# Patient Record
Sex: MALE | Race: WHITE | NOT HISPANIC OR LATINO | Employment: OTHER | URBAN - METROPOLITAN AREA
[De-identification: names, ages, dates, MRNs, and addresses within clinical notes are randomized per-mention and may not be internally consistent; named-entity substitution may affect disease eponyms.]

---

## 2022-04-07 ENCOUNTER — HISTORICAL (OUTPATIENT)
Dept: ADMINISTRATIVE | Facility: HOSPITAL | Age: 77
End: 2022-04-07
Payer: MEDICARE

## 2022-04-24 VITALS
HEIGHT: 69 IN | WEIGHT: 229.06 LBS | SYSTOLIC BLOOD PRESSURE: 130 MMHG | BODY MASS INDEX: 33.93 KG/M2 | DIASTOLIC BLOOD PRESSURE: 78 MMHG

## 2022-09-27 ENCOUNTER — OFFICE VISIT (OUTPATIENT)
Dept: NEUROLOGY | Facility: CLINIC | Age: 77
End: 2022-09-27
Payer: MEDICARE

## 2022-09-27 VITALS
BODY MASS INDEX: 34.71 KG/M2 | HEIGHT: 68 IN | DIASTOLIC BLOOD PRESSURE: 74 MMHG | WEIGHT: 229 LBS | SYSTOLIC BLOOD PRESSURE: 132 MMHG

## 2022-09-27 DIAGNOSIS — G20.A1 PARKINSON'S DISEASE: Primary | ICD-10-CM

## 2022-09-27 PROCEDURE — 99213 OFFICE O/P EST LOW 20 MIN: CPT | Mod: PBBFAC | Performed by: PSYCHIATRY & NEUROLOGY

## 2022-09-27 PROCEDURE — 99999 PR PBB SHADOW E&M-EST. PATIENT-LVL III: ICD-10-PCS | Mod: PBBFAC,,, | Performed by: PSYCHIATRY & NEUROLOGY

## 2022-09-27 PROCEDURE — 99214 PR OFFICE/OUTPT VISIT, EST, LEVL IV, 30-39 MIN: ICD-10-PCS | Mod: S$PBB,,, | Performed by: PSYCHIATRY & NEUROLOGY

## 2022-09-27 PROCEDURE — 99214 OFFICE O/P EST MOD 30 MIN: CPT | Mod: S$PBB,,, | Performed by: PSYCHIATRY & NEUROLOGY

## 2022-09-27 PROCEDURE — 99999 PR PBB SHADOW E&M-EST. PATIENT-LVL III: CPT | Mod: PBBFAC,,, | Performed by: PSYCHIATRY & NEUROLOGY

## 2022-09-27 RX ORDER — ISOSORBIDE MONONITRATE 60 MG/1
60 TABLET, EXTENDED RELEASE ORAL DAILY
Status: ON HOLD | COMMUNITY
Start: 2022-08-29 | End: 2024-03-06 | Stop reason: HOSPADM

## 2022-09-27 RX ORDER — CARBIDOPA AND LEVODOPA 25; 100 MG/1; MG/1
1 TABLET ORAL 3 TIMES DAILY
COMMUNITY
Start: 2022-08-29 | End: 2022-09-27 | Stop reason: SDUPTHER

## 2022-09-27 RX ORDER — CLOPIDOGREL BISULFATE 75 MG/1
75 TABLET ORAL DAILY
COMMUNITY
Start: 2022-08-29

## 2022-09-27 RX ORDER — CARBIDOPA AND LEVODOPA 25; 100 MG/1; MG/1
2 TABLET ORAL 3 TIMES DAILY
Qty: 540 TABLET | Refills: 4 | Status: ON HOLD | OUTPATIENT
Start: 2022-09-27 | End: 2024-03-06 | Stop reason: HOSPADM

## 2022-09-27 RX ORDER — ISOSORBIDE DINITRATE 20 MG/1
TABLET ORAL
COMMUNITY
End: 2023-09-25

## 2022-09-27 RX ORDER — METOPROLOL SUCCINATE 25 MG/1
25 TABLET, EXTENDED RELEASE ORAL 2 TIMES DAILY
COMMUNITY
Start: 2022-08-29

## 2022-09-27 RX ORDER — NITROGLYCERIN 0.4 MG/1
0.4 TABLET SUBLINGUAL EVERY 5 MIN PRN
Status: ON HOLD | COMMUNITY
Start: 2022-09-01 | End: 2024-03-06 | Stop reason: HOSPADM

## 2022-09-27 RX ORDER — PRAVASTATIN SODIUM 80 MG/1
80 TABLET ORAL NIGHTLY
COMMUNITY
Start: 2022-08-29

## 2022-09-27 RX ORDER — DULOXETIN HYDROCHLORIDE 60 MG/1
60 CAPSULE, DELAYED RELEASE ORAL DAILY
COMMUNITY

## 2022-09-27 RX ORDER — LISINOPRIL 20 MG/1
20 TABLET ORAL NIGHTLY
COMMUNITY
Start: 2022-08-29

## 2022-09-27 RX ORDER — RANOLAZINE 1000 MG/1
1000 TABLET, EXTENDED RELEASE ORAL 2 TIMES DAILY
COMMUNITY
Start: 2022-08-29

## 2022-09-27 RX ORDER — HYDROCHLOROTHIAZIDE 12.5 MG/1
12.5 CAPSULE ORAL DAILY
COMMUNITY
Start: 2022-08-29

## 2022-09-27 NOTE — PROGRESS NOTES
"NEUROLOGY  OUTPATIENT OFFICE VISIT NOTE    SUBJECTIVE:      HPI: Jose Alejandro Minor is a 77 y.o. male seen in neurology clinic for follow up of parkinson's disease. Last seen in 9/2021.     Compliant with Senemet 25/100 TID; started 2 years prior   No recent medication changes  Daughter states tremors are worsening in the b/l hands, worse in the morning   Also endorsing stiffer legs and some indigestion   Mild dysphagia when leaning back   Denies hallucinations or recent falls     ROS:  Negative except as stated above     OBJECTIVE:     Vital signs:   /74 (BP Location: Left arm, Patient Position: Sitting)   Ht 5' 8" (1.727 m)   Wt 103.9 kg (229 lb)   BMI 34.82 kg/m²      Physical Examination:  General:  Well-nourished, well-developed, no acute distress  HEENT: Normocephalic, atraumatic. EOMI.  MMM  Neck: Supple. No obvious JVD.  Normal range of motion.  Respiratory: CTAB.  No obvious crackles wheeze or rhonchi.  Cardiovascular:  RRR.  No obvious M/G/R. Warm extremities.  Peripheral pulses intact.  No edema.  Gastrointestinal:  Soft, nontender, nondistended.  Normal bowel sounds.  Neurologic: ANOx3.  Answering questions/following commands appropriately.  No FND. +chin and b/l hand tremors.      Current Outpatient Medications:     clopidogreL (PLAVIX) 75 mg tablet, Take 75 mg by mouth once daily., Disp: , Rfl:     DULoxetine (CYMBALTA) 60 MG capsule, , Disp: , Rfl:     hydroCHLOROthiazide (MICROZIDE) 12.5 mg capsule, Take 12.5 mg by mouth once daily., Disp: , Rfl:     isosorbide dinitrate (ISORDIL) 20 MG tablet, , Disp: , Rfl:     isosorbide mononitrate (IMDUR) 60 MG 24 hr tablet, Take 60 mg by mouth once daily., Disp: , Rfl:     lisinopriL (PRINIVIL,ZESTRIL) 20 MG tablet, Take 20 mg by mouth once daily., Disp: , Rfl:     metoprolol succinate (TOPROL-XL) 25 MG 24 hr tablet, Take 25 mg by mouth 2 (two) times daily., Disp: , Rfl:     nitroGLYCERIN (NITROSTAT) 0.4 MG SL tablet, Place under the tongue., Disp: , " Rfl:     pravastatin (PRAVACHOL) 80 MG tablet, Take 80 mg by mouth once daily., Disp: , Rfl:     ranolazine (RANEXA) 1,000 mg Tb12, Take 1,000 mg by mouth 2 (two) times daily., Disp: , Rfl:     carbidopa-levodopa  mg (SINEMET)  mg per tablet, Take 2 tablets by mouth 3 (three) times daily., Disp: 540 tablet, Rfl: 4     ASSESSMENT & PLAN:     Parkinson's Disease     -increase Senemet, 2 tablets TID  -small frequent meals, fluid hydration, probiotics as needed for indigestion     RTC 6 months     Ella Heath MD, PGY-3     I was present with the resident during the history and exam.    [x ] I discussed the case with the resident and agree with the findings and plan as documented in the resident's note.  [ ] I discussed the case with the resident and agree with the findings and plan as documented in the resident's note except:    Uche Jones MD  [facility]

## 2023-09-25 ENCOUNTER — OFFICE VISIT (OUTPATIENT)
Dept: NEUROLOGY | Facility: CLINIC | Age: 78
End: 2023-09-25
Payer: MEDICARE

## 2023-09-25 VITALS
HEIGHT: 68 IN | DIASTOLIC BLOOD PRESSURE: 71 MMHG | WEIGHT: 195 LBS | SYSTOLIC BLOOD PRESSURE: 109 MMHG | HEART RATE: 64 BPM | BODY MASS INDEX: 29.55 KG/M2

## 2023-09-25 DIAGNOSIS — G20.A1 PARKINSON'S DISEASE: Primary | ICD-10-CM

## 2023-09-25 PROCEDURE — 99999 PR PBB SHADOW E&M-EST. PATIENT-LVL III: CPT | Mod: PBBFAC,,, | Performed by: PSYCHIATRY & NEUROLOGY

## 2023-09-25 PROCEDURE — 99214 OFFICE O/P EST MOD 30 MIN: CPT | Mod: S$PBB,,, | Performed by: PSYCHIATRY & NEUROLOGY

## 2023-09-25 PROCEDURE — 99999 PR PBB SHADOW E&M-EST. PATIENT-LVL III: ICD-10-PCS | Mod: PBBFAC,,, | Performed by: PSYCHIATRY & NEUROLOGY

## 2023-09-25 PROCEDURE — 99213 OFFICE O/P EST LOW 20 MIN: CPT | Mod: PBBFAC | Performed by: PSYCHIATRY & NEUROLOGY

## 2023-09-25 PROCEDURE — 99214 PR OFFICE/OUTPT VISIT, EST, LEVL IV, 30-39 MIN: ICD-10-PCS | Mod: S$PBB,,, | Performed by: PSYCHIATRY & NEUROLOGY

## 2023-09-25 RX ORDER — CARBIDOPA AND LEVODOPA 50; 200 MG/1; MG/1
1 TABLET, EXTENDED RELEASE ORAL 3 TIMES DAILY
Qty: 270 TABLET | Refills: 3 | Status: SHIPPED | OUTPATIENT
Start: 2023-09-25 | End: 2024-09-24

## 2023-09-25 RX ORDER — MULTIVITAMIN
1 TABLET ORAL EVERY MORNING
Status: ON HOLD | COMMUNITY
End: 2024-03-06 | Stop reason: HOSPADM

## 2023-09-25 RX ORDER — ASPIRIN 325 MG
1 TABLET, DELAYED RELEASE (ENTERIC COATED) ORAL NIGHTLY
COMMUNITY
End: 2024-03-06 | Stop reason: HOSPADM

## 2023-09-25 RX ORDER — PANTOPRAZOLE SODIUM 40 MG/1
40 TABLET, DELAYED RELEASE ORAL 2 TIMES DAILY
COMMUNITY
Start: 2023-09-22

## 2023-09-25 NOTE — PROGRESS NOTES
Subjective     Patient ID: Jose Alejandro Minor is a 78 y.o. male.    Chief Complaint: Parkinson's disease    HPI  PD  Typically takkes 4-6 sinemet 25/100 per day (cc misses noon dose)  Tremors are worse  Since last seen, had bleeidng ulcer, anemia, and MI  Lives alone; daughter helps with meds  Review of Systems  The remainder of the 14 system ROS is noncontributory or negative unless mentioned/reviewed above.       Objective     Physical Exam  4 ext tremor  Mental Status: Alert and oriented x3. Language is fluent with good comprehension.    Cranial Nerve: Pupils are equal, round, and reactive to light. Visual fields are intact to confrontation. Normal fundi. Ocular movements are intact. Face is symmetric at rest and with activation with intact sensation throughout. Hearing intact to finger rub bilaterally. Muscles of tongue and palate activate symmetrically. No dysarthria. Strength is full in sternocleidomastoid and trapezius bilaterally.    Motor: Muscle bulk and tone are normal. Strength is 5/5 in all four extremities both proximally and distally. Intact fine motor movements bilaterally. There is no pronator drift or satelliting on arm roll.    Sensory: Sensation is intact to light touch, pinprick, vibration, and proprioception throughout. Romberg is negative.    Reflexes: 2+ and symmetric at the biceps, triceps, brachioradialis, patella, and Achilles bilaterally. Plantar response is flexor bilaterally.    Coordination: No dysmetria on finger-nose-finger or heel-knee-shin. Normal rapid alternating movements. Fast finger tapping with normal amplitude and speed.    Gait: Narrow based with normal stride length and good arm swing bilaterally. Able to walk on heels, toes, and in tandem.       Assessment and Plan     1. Parkinson's disease    Other orders  -     carbidopa-levodopa  mg (SINEMET CR)  mg TbSR; Take 1 tablet by mouth 3 (three) times daily.  Dispense: 270 tablet; Refill: 3        Change to sinemet  cr 2-3/day; ok to take extra         Follow up in about 6 weeks (around 11/6/2023).

## 2024-02-15 DIAGNOSIS — I35.0 AORTIC VALVE STENOSIS: Primary | ICD-10-CM

## 2024-02-20 ENCOUNTER — HOSPITAL ENCOUNTER (OUTPATIENT)
Facility: HOSPITAL | Age: 79
Discharge: HOME | End: 2024-02-20
Attending: STUDENT IN AN ORGANIZED HEALTH CARE EDUCATION/TRAINING PROGRAM | Admitting: STUDENT IN AN ORGANIZED HEALTH CARE EDUCATION/TRAINING PROGRAM
Payer: MEDICARE

## 2024-02-20 VITALS
HEIGHT: 68 IN | OXYGEN SATURATION: 98 % | DIASTOLIC BLOOD PRESSURE: 63 MMHG | RESPIRATION RATE: 10 BRPM | HEART RATE: 52 BPM | SYSTOLIC BLOOD PRESSURE: 135 MMHG | BODY MASS INDEX: 30.07 KG/M2 | TEMPERATURE: 98 F | WEIGHT: 198.44 LBS

## 2024-02-20 DIAGNOSIS — I25.10 CORONARY ATHEROSCLEROSIS OF NATIVE CORONARY ARTERY: ICD-10-CM

## 2024-02-20 DIAGNOSIS — R07.9 CHEST PAIN: ICD-10-CM

## 2024-02-20 PROCEDURE — 25000003 PHARM REV CODE 250: Performed by: STUDENT IN AN ORGANIZED HEALTH CARE EDUCATION/TRAINING PROGRAM

## 2024-02-20 PROCEDURE — 27201423 OPTIME MED/SURG SUP & DEVICES STERILE SUPPLY: Performed by: STUDENT IN AN ORGANIZED HEALTH CARE EDUCATION/TRAINING PROGRAM

## 2024-02-20 PROCEDURE — C1894 INTRO/SHEATH, NON-LASER: HCPCS | Performed by: STUDENT IN AN ORGANIZED HEALTH CARE EDUCATION/TRAINING PROGRAM

## 2024-02-20 PROCEDURE — 99153 MOD SED SAME PHYS/QHP EA: CPT | Performed by: STUDENT IN AN ORGANIZED HEALTH CARE EDUCATION/TRAINING PROGRAM

## 2024-02-20 PROCEDURE — 93457 R HRT ART/GRFT ANGIO: CPT | Performed by: STUDENT IN AN ORGANIZED HEALTH CARE EDUCATION/TRAINING PROGRAM

## 2024-02-20 PROCEDURE — C1725 CATH, TRANSLUMIN NON-LASER: HCPCS | Performed by: STUDENT IN AN ORGANIZED HEALTH CARE EDUCATION/TRAINING PROGRAM

## 2024-02-20 PROCEDURE — 99152 MOD SED SAME PHYS/QHP 5/>YRS: CPT | Performed by: STUDENT IN AN ORGANIZED HEALTH CARE EDUCATION/TRAINING PROGRAM

## 2024-02-20 PROCEDURE — 25500020 PHARM REV CODE 255: Performed by: STUDENT IN AN ORGANIZED HEALTH CARE EDUCATION/TRAINING PROGRAM

## 2024-02-20 PROCEDURE — 63600175 PHARM REV CODE 636 W HCPCS: Performed by: STUDENT IN AN ORGANIZED HEALTH CARE EDUCATION/TRAINING PROGRAM

## 2024-02-20 PROCEDURE — C1769 GUIDE WIRE: HCPCS | Performed by: STUDENT IN AN ORGANIZED HEALTH CARE EDUCATION/TRAINING PROGRAM

## 2024-02-20 PROCEDURE — C1751 CATH, INF, PER/CENT/MIDLINE: HCPCS | Performed by: STUDENT IN AN ORGANIZED HEALTH CARE EDUCATION/TRAINING PROGRAM

## 2024-02-20 RX ORDER — ONDANSETRON 4 MG/1
8 TABLET, ORALLY DISINTEGRATING ORAL EVERY 8 HOURS PRN
Status: DISCONTINUED | OUTPATIENT
Start: 2024-02-20 | End: 2024-02-20 | Stop reason: HOSPADM

## 2024-02-20 RX ORDER — HYDROCODONE BITARTRATE AND ACETAMINOPHEN 5; 325 MG/1; MG/1
1 TABLET ORAL EVERY 4 HOURS PRN
Status: DISCONTINUED | OUTPATIENT
Start: 2024-02-20 | End: 2024-02-20 | Stop reason: HOSPADM

## 2024-02-20 RX ORDER — SODIUM CHLORIDE 9 MG/ML
INJECTION, SOLUTION INTRAVENOUS ONCE
Status: COMPLETED | OUTPATIENT
Start: 2024-02-20 | End: 2024-02-20

## 2024-02-20 RX ORDER — IRON,CARB/VIT C/VIT B12/FOLIC 100-250-1
1 TABLET ORAL DAILY
COMMUNITY
Start: 2024-02-14

## 2024-02-20 RX ORDER — HYDRALAZINE HYDROCHLORIDE 20 MG/ML
10 INJECTION INTRAMUSCULAR; INTRAVENOUS EVERY 4 HOURS PRN
Status: DISCONTINUED | OUTPATIENT
Start: 2024-02-20 | End: 2024-02-20 | Stop reason: HOSPADM

## 2024-02-20 RX ORDER — ACETAMINOPHEN 325 MG/1
650 TABLET ORAL EVERY 4 HOURS PRN
Status: DISCONTINUED | OUTPATIENT
Start: 2024-02-20 | End: 2024-02-20 | Stop reason: HOSPADM

## 2024-02-20 RX ORDER — FENTANYL CITRATE 50 UG/ML
INJECTION, SOLUTION INTRAMUSCULAR; INTRAVENOUS
Status: DISCONTINUED | OUTPATIENT
Start: 2024-02-20 | End: 2024-02-20 | Stop reason: HOSPADM

## 2024-02-20 RX ORDER — ASCORBIC ACID 500 MG
500 TABLET ORAL 2 TIMES DAILY
COMMUNITY

## 2024-02-20 RX ORDER — SODIUM CHLORIDE 0.9 % (FLUSH) 0.9 %
10 SYRINGE (ML) INJECTION
Status: ACTIVE | OUTPATIENT
Start: 2024-02-20

## 2024-02-20 RX ORDER — MORPHINE SULFATE 4 MG/ML
2 INJECTION, SOLUTION INTRAMUSCULAR; INTRAVENOUS EVERY 4 HOURS PRN
Status: DISCONTINUED | OUTPATIENT
Start: 2024-02-20 | End: 2024-02-20 | Stop reason: HOSPADM

## 2024-02-20 RX ORDER — SODIUM CHLORIDE 9 MG/ML
INJECTION, SOLUTION INTRAVENOUS CONTINUOUS
Status: DISCONTINUED | OUTPATIENT
Start: 2024-02-20 | End: 2024-02-20 | Stop reason: HOSPADM

## 2024-02-20 RX ORDER — LIDOCAINE HYDROCHLORIDE 10 MG/ML
INJECTION INFILTRATION; PERINEURAL
Status: DISCONTINUED | OUTPATIENT
Start: 2024-02-20 | End: 2024-02-20 | Stop reason: HOSPADM

## 2024-02-20 RX ORDER — MIDAZOLAM HYDROCHLORIDE 1 MG/ML
INJECTION INTRAMUSCULAR; INTRAVENOUS
Status: DISCONTINUED | OUTPATIENT
Start: 2024-02-20 | End: 2024-02-20 | Stop reason: HOSPADM

## 2024-02-20 RX ORDER — QUETIAPINE FUMARATE 50 MG/1
75 TABLET, EXTENDED RELEASE ORAL NIGHTLY
COMMUNITY

## 2024-02-20 RX ORDER — DIPHENHYDRAMINE HCL 25 MG
50 CAPSULE ORAL
Status: DISPENSED | OUTPATIENT
Start: 2024-02-20

## 2024-02-20 RX ORDER — DIAZEPAM 5 MG/1
10 TABLET ORAL
Status: DISPENSED | OUTPATIENT
Start: 2024-02-20

## 2024-02-20 RX ADMIN — DIAZEPAM 10 MG: 5 TABLET ORAL at 09:02

## 2024-02-20 RX ADMIN — SODIUM CHLORIDE: 9 INJECTION, SOLUTION INTRAVENOUS at 09:02

## 2024-02-20 RX ADMIN — DIPHENHYDRAMINE HYDROCHLORIDE 50 MG: 25 CAPSULE ORAL at 09:02

## 2024-02-20 RX ADMIN — SODIUM CHLORIDE: 9 INJECTION, SOLUTION INTRAVENOUS at 01:02

## 2024-02-20 NOTE — Clinical Note
The catheter was inserted into the, was removed from the and was inserted over the wire into the. RHC

## 2024-02-20 NOTE — BRIEF OP NOTE
[facility] - Cath Lab Services  Brief Operative Note    SUMMARY     Surgery Date: 2/20/2024     Surgeon(s) and Role:     * Trevor Tesfaye Jr., MD - Primary    Assisting Surgeon: None    Pre-op Diagnosis:  Coronary atherosclerosis of native coronary artery [I25.10]    Post-op Diagnosis:  Post-Op Diagnosis Codes:     * Atherosclerosis of native coronary artery of native heart without angina pectoris [I25.10]    Procedure(s) (LRB):  CATHETERIZATION, HEART, BOTH LEFT AND RIGHT (N/A)    Anesthesia: RN IV Sedation    Implants:  * No implants in log *    Operative Findings: chronic stable CAD    Estimated Blood Loss: * No values recorded between 2/20/2024  9:49 AM and 2/20/2024 10:51 AM *    Estimated Blood Loss has been documented.         Specimens:   Specimen (24h ago, onward)      None            [id number]

## 2024-02-20 NOTE — Clinical Note
The catheter was inserted into the, was removed from the and was inserted over the wire into the aorta. Hemodynamics were performed.  Non Selective Root

## 2024-02-20 NOTE — Clinical Note
The catheter was inserted into the, was removed from the and was inserted over the wire into the ostium   right coronary artery. Hemodynamics were performed.  An angiography was performed of the left coronary arteries and right coronary arteries. Multiple views were taken. The angiography was performed via power injection.

## 2024-02-20 NOTE — DISCHARGE SUMMARY
[facility] - Cath Lab Services  Discharge Note  Short Stay    Procedure(s) (LRB):  CATHETERIZATION, HEART, BOTH LEFT AND RIGHT (N/A)      OUTCOME: Patient tolerated treatment/procedure well without complication and is now ready for discharge.    DISPOSITION: Home or Self Care    FINAL DIAGNOSIS: chronic stable CAD    FOLLOWUP: In clinic    DISCHARGE INSTRUCTIONS:  No discharge procedures on file.     TIME SPENT ON DISCHARGE: 30 minutes

## 2024-02-20 NOTE — Clinical Note
The catheter was inserted into the, was removed from the, insertion attempt was made into the and was inserted over the wire into the.

## 2024-02-20 NOTE — Clinical Note
The catheter was inserted into the, was removed from the and was inserted over the wire into the ostial LIMA graft. Hemodynamics were performed.  LIMA-LAD

## 2024-02-20 NOTE — Clinical Note
The catheter was repositioned into the ostial SVG graft. Hemodynamics were performed.  Unable to locate grafts

## 2024-02-28 RX ORDER — CHOLECALCIFEROL (VITAMIN D3) 50 MCG
1 TABLET ORAL NIGHTLY
COMMUNITY

## 2024-02-29 ENCOUNTER — HOSPITAL ENCOUNTER (OUTPATIENT)
Dept: RADIOLOGY | Facility: HOSPITAL | Age: 79
Discharge: HOME | End: 2024-02-29
Attending: THORACIC SURGERY (CARDIOTHORACIC VASCULAR SURGERY)
Payer: MEDICARE

## 2024-02-29 ENCOUNTER — OFFICE VISIT (OUTPATIENT)
Dept: CARDIAC SURGERY | Facility: CLINIC | Age: 79
End: 2024-02-29
Payer: MEDICARE

## 2024-02-29 VITALS
HEIGHT: 68 IN | DIASTOLIC BLOOD PRESSURE: 65 MMHG | WEIGHT: 196 LBS | HEART RATE: 65 BPM | OXYGEN SATURATION: 92 % | SYSTOLIC BLOOD PRESSURE: 108 MMHG | BODY MASS INDEX: 29.7 KG/M2

## 2024-02-29 DIAGNOSIS — I35.0 NODULAR CALCIFIC AORTIC VALVE STENOSIS: ICD-10-CM

## 2024-02-29 DIAGNOSIS — I35.0 AORTIC VALVE STENOSIS: ICD-10-CM

## 2024-02-29 DIAGNOSIS — I35.0 NONRHEUMATIC AORTIC VALVE STENOSIS: Primary | ICD-10-CM

## 2024-02-29 PROCEDURE — 99204 OFFICE O/P NEW MOD 45 MIN: CPT | Mod: ,,, | Performed by: THORACIC SURGERY (CARDIOTHORACIC VASCULAR SURGERY)

## 2024-02-29 PROCEDURE — 71046 X-RAY EXAM CHEST 2 VIEWS: CPT | Mod: TC

## 2024-02-29 NOTE — PROGRESS NOTES
[facility]  Cardiothoracic Surgery  Consult Note    Patient Name: Jose Alejandro Minor  MRN: [id number]  Date of Service: 2/29/2024  Referring Provider: Trevor Mcbride Jr., *    Patient information was obtained from patient, past medical records, and primary team    Subjective:     Chief Complaint   Patient presents with    Pre-op Exam     TAVR EVAL, PROCEDURE DATE MARCH 5TH W/ DR. MCBRIDE  C/o groin pain 05/10       History of Present Illness:  Very nice patient with [id number] aortic stenosis as judged by echocardiogram performed November 29, 2023 indicating an aortic valve area of 0.94 sq cm.    The patient denies fever, chills, nausea, vomiting, headache, syncope, palpitations, or chest pain.  He has had coronary artery bypass grafting and has patent grafts to his LAD.  CTA reviewed and found to contain adequate access for consideration for TAVR.    Current Outpatient Medications on File Prior to Visit   Medication Sig    ascorbic acid, vitamin C, (VITAMIN C) 500 MG tablet Take 500 mg by mouth 2 (two) times daily.    aspirin (ECOTRIN) 325 MG EC tablet Take 1 tablet by mouth every evening.    calcium carbonate/vitamin D3 (VITAMIN D-3 ORAL) Take 50 mg by mouth Daily.    carbidopa-levodopa  mg (SINEMET)  mg per tablet Take 2 tablets by mouth 3 (three) times daily.    carbidopa-levodopa  mg (SINEMET CR)  mg TbSR Take 1 tablet by mouth 3 (three) times daily.    cholecalciferol, vitamin D3, (VITAMIN D3) 50 mcg (2,000 unit) Tab Take 1 tablet by mouth every evening.    clopidogreL (PLAVIX) 75 mg tablet Take 75 mg by mouth once daily.    DULoxetine (CYMBALTA) 60 MG capsule Take 60 mg by mouth once daily.    hydroCHLOROthiazide (MICROZIDE) 12.5 mg capsule Take 12.5 mg by mouth once daily.    IRON 100 PLUS Tab Take 1 tablet by mouth Daily.    isosorbide mononitrate (IMDUR) 60 MG 24 hr tablet Take 60 mg by mouth once daily.    Lactobacillus rhamnosus GG (CULTURELLE) 10  billion cell capsule Take 1 capsule by mouth every evening.    lisinopriL (PRINIVIL,ZESTRIL) 20 MG tablet Take 20 mg by mouth every evening.    metoprolol succinate (TOPROL-XL) 25 MG 24 hr tablet Take 25 mg by mouth 2 (two) times daily.    multivit-min/folic/vit K/lycop (MEN'S MULTIVITAMIN ORAL) Take 1 tablet by mouth every evening.    multivitamin with folic acid 400 mcg Tab Take 1 tablet by mouth every morning.    nitroGLYCERIN (NITROSTAT) 0.4 MG SL tablet Place 0.4 mg under the tongue every 5 (five) minutes as needed for Chest pain.    pantoprazole (PROTONIX) 40 MG tablet Take 40 mg by mouth 2 (two) times daily.    pravastatin (PRAVACHOL) 80 MG tablet Take 80 mg by mouth every evening.    QUEtiapine (SEROQUEL XR) 50 mg Tb24 Take 75 mg by mouth every evening.    ranolazine (RANEXA) 1,000 mg Tb12 Take 1,000 mg by mouth 2 (two) times daily.    zinc acetate 50 mg (zinc) Cap Take 1 capsule by mouth every evening.     Current Facility-Administered Medications on File Prior to Visit   Medication    diazePAM tablet 10 mg    diphenhydrAMINE capsule 50 mg    sodium chloride 0.9% flush 10 mL       Review of patient's allergies indicates:  No Known Allergies    Past Medical History:   Diagnosis Date    Anxiety     Aortic stenosis     Arthritis     Bleeding stomach ulcer 05/2023    Coronary artery disease     Depression     Fatigue     GERD (gastroesophageal reflux disease)     Heart attack     05/2023    Heart disease     HLD (hyperlipidemia)     HTN (hypertension)     Obesity     Parkinson's disease      Past Surgical History:   Procedure Laterality Date    CATARACT EXTRACTION Bilateral     CATHETERIZATION OF BOTH LEFT AND RIGHT HEART N/A 02/20/2024    Procedure: CATHETERIZATION, HEART, BOTH LEFT AND RIGHT;  Surgeon: Trevor Tesfaye Jr., MD;  Location: [facility] CATH LAB;  Service: Cardiology;  Laterality: N/A;  RLHC +/- PCI VIA RFA    CORONARY ARTERY BYPASS GRAFT      CORONARY STENT PLACEMENT      x2     ESOPHAGOGASTRODUODENOSCOPY      TOTAL HIP ARTHROPLASTY Left      Family History       Problem Relation (Age of Onset)    Stroke Father          Tobacco Use    Smoking status: Never    Smokeless tobacco: Never   Substance and Sexual Activity    Alcohol use: Never    Drug use: Never    Sexual activity: Not on file     Review of Systems   Cardiovascular:  Positive for dyspnea on exertion.   Respiratory:  Positive for shortness of breath.      Objective:     Vitals:    02/29/24 0915   BP: 108/65   Pulse: 65        Weight: 88.9 kg (196 lb)  Body mass index is 29.8 kg/m².     STS Risk Score:  6%    Physical Exam  Vitals reviewed.   Constitutional:       General: He is not in acute distress.     Appearance: Normal appearance.   HENT:      Head: Normocephalic and atraumatic.      Nose: No congestion or rhinorrhea.      Mouth/Throat:      Mouth: Mucous membranes are moist.      Pharynx: Oropharynx is clear. No oropharyngeal exudate or posterior oropharyngeal erythema.   Eyes:      Extraocular Movements: Extraocular movements intact.      Conjunctiva/sclera: Conjunctivae normal.      Pupils: Pupils are equal, round, and reactive to light.   Neck:      Thyroid: No thyroid mass.      Vascular: No carotid bruit or JVD.   Cardiovascular:      Rate and Rhythm: Normal rate and regular rhythm.      Pulses: Normal pulses.           Carotid pulses are 2+ on the right side and 2+ on the left side.       Radial pulses are 2+ on the right side and 2+ on the left side.        Femoral pulses are 2+ on the right side and 2+ on the left side.       Dorsalis pedis pulses are 2+ on the right side and 2+ on the left side.        Posterior tibial pulses are 2+ on the right side and 2+ on the left side.      Heart sounds: Murmur heard.      Harsh midsystolic murmur is present with a grade of 3/6 at the upper right sternal border radiating to the neck.      No friction rub. No gallop.   Pulmonary:      Effort: Pulmonary effort is normal. No  respiratory distress.      Breath sounds: No wheezing, rhonchi or rales.   Chest:      Chest wall: No tenderness.   Abdominal:      General: Abdomen is flat. Bowel sounds are normal. There is no distension.      Palpations: Abdomen is soft. There is no mass.      Tenderness: There is no abdominal tenderness.   Musculoskeletal:         General: No swelling. Normal range of motion.      Cervical back: Normal range of motion and neck supple.      Right lower leg: No edema.      Left lower leg: No edema.   Lymphadenopathy:      Cervical: No cervical adenopathy.      Upper Body:      Right upper body: No supraclavicular or axillary adenopathy.      Left upper body: No supraclavicular or axillary adenopathy.   Skin:     General: Skin is warm and dry.   Neurological:      General: No focal deficit present.      Mental Status: He is alert and oriented to person, place, and time.      Cranial Nerves: No cranial nerve deficit.      Sensory: No sensory deficit.      Motor: No weakness.   Psychiatric:         Mood and Affect: Mood normal.          Significant Labs:  Reviewed    Significant Diagnostics:  Reviewed    Assessment/Plan:     Problem List Items Addressed This Visit          Cardiac/Vascular    Aortic stenosis - Primary     Agree with transcatheter aortic valve replacement.  Risks, benefits, and alternatives have been discussed.  Questions have been answered.  Patient voices understanding and agrees to proceed.          Other Visit Diagnoses       Aortic valve stenosis                 Thank you for your consult. I will follow-up with patient. Please contact us if you have any additional questions.    MADI Renae MD, FACC, FACS  Cardiothoracic Surgery  [facility]

## 2024-02-29 NOTE — ASSESSMENT & PLAN NOTE
Agree with transcatheter aortic valve replacement.  Risks, benefits, and alternatives have been discussed.  Questions have been answered.  Patient voices understanding and agrees to proceed.

## 2024-03-04 ENCOUNTER — ANESTHESIA EVENT (OUTPATIENT)
Dept: CARDIOLOGY | Facility: HOSPITAL | Age: 79
DRG: 267 | End: 2024-03-04
Payer: MEDICARE

## 2024-03-05 ENCOUNTER — HOSPITAL ENCOUNTER (INPATIENT)
Facility: HOSPITAL | Age: 79
LOS: 1 days | Discharge: HOME | DRG: 267 | End: 2024-03-06
Attending: STUDENT IN AN ORGANIZED HEALTH CARE EDUCATION/TRAINING PROGRAM | Admitting: STUDENT IN AN ORGANIZED HEALTH CARE EDUCATION/TRAINING PROGRAM
Payer: MEDICARE

## 2024-03-05 ENCOUNTER — ANESTHESIA (OUTPATIENT)
Dept: CARDIOLOGY | Facility: HOSPITAL | Age: 79
DRG: 267 | End: 2024-03-05
Payer: MEDICARE

## 2024-03-05 DIAGNOSIS — R07.9 CHEST PAIN: ICD-10-CM

## 2024-03-05 DIAGNOSIS — I35.0 AORTIC STENOSIS: ICD-10-CM

## 2024-03-05 LAB
GROUP & RH: NORMAL
INDIRECT COOMBS: NORMAL
OHS QRS DURATION: 100 MS
OHS QTC CALCULATION: 460 MS
POC ACTIVATED CLOTTING TIME K: 147 SEC (ref 74–137)
SAMPLE: ABNORMAL
SPECIMEN OUTDATE: NORMAL

## 2024-03-05 PROCEDURE — 86850 RBC ANTIBODY SCREEN: CPT | Performed by: STUDENT IN AN ORGANIZED HEALTH CARE EDUCATION/TRAINING PROGRAM

## 2024-03-05 PROCEDURE — 93005 ELECTROCARDIOGRAM TRACING: CPT

## 2024-03-05 PROCEDURE — 27000221 HC OXYGEN, UP TO 24 HOURS

## 2024-03-05 PROCEDURE — 25000003 PHARM REV CODE 250: Performed by: STUDENT IN AN ORGANIZED HEALTH CARE EDUCATION/TRAINING PROGRAM

## 2024-03-05 PROCEDURE — 63600175 PHARM REV CODE 636 W HCPCS: Performed by: STUDENT IN AN ORGANIZED HEALTH CARE EDUCATION/TRAINING PROGRAM

## 2024-03-05 PROCEDURE — C1883 ADAPT/EXT, PACING/NEURO LEAD: HCPCS | Performed by: STUDENT IN AN ORGANIZED HEALTH CARE EDUCATION/TRAINING PROGRAM

## 2024-03-05 PROCEDURE — D9220A PRA ANESTHESIA: Mod: Q0,ANES,, | Performed by: ANESTHESIOLOGY

## 2024-03-05 PROCEDURE — 11000001 HC ACUTE MED/SURG PRIVATE ROOM

## 2024-03-05 PROCEDURE — 37000008 HC ANESTHESIA 1ST 15 MINUTES: Performed by: STUDENT IN AN ORGANIZED HEALTH CARE EDUCATION/TRAINING PROGRAM

## 2024-03-05 PROCEDURE — 27800903 OPTIME MED/SURG SUP & DEVICES OTHER IMPLANTS: Performed by: STUDENT IN AN ORGANIZED HEALTH CARE EDUCATION/TRAINING PROGRAM

## 2024-03-05 PROCEDURE — C1760 CLOSURE DEV, VASC: HCPCS | Performed by: STUDENT IN AN ORGANIZED HEALTH CARE EDUCATION/TRAINING PROGRAM

## 2024-03-05 PROCEDURE — 37000009 HC ANESTHESIA EA ADD 15 MINS: Performed by: STUDENT IN AN ORGANIZED HEALTH CARE EDUCATION/TRAINING PROGRAM

## 2024-03-05 PROCEDURE — 33361 REPLACE AORTIC VALVE PERQ: CPT | Mod: 62,Q0,, | Performed by: THORACIC SURGERY (CARDIOTHORACIC VASCULAR SURGERY)

## 2024-03-05 PROCEDURE — C1894 INTRO/SHEATH, NON-LASER: HCPCS | Performed by: STUDENT IN AN ORGANIZED HEALTH CARE EDUCATION/TRAINING PROGRAM

## 2024-03-05 PROCEDURE — C1769 GUIDE WIRE: HCPCS | Performed by: STUDENT IN AN ORGANIZED HEALTH CARE EDUCATION/TRAINING PROGRAM

## 2024-03-05 PROCEDURE — 86923 COMPATIBILITY TEST ELECTRIC: CPT | Performed by: STUDENT IN AN ORGANIZED HEALTH CARE EDUCATION/TRAINING PROGRAM

## 2024-03-05 PROCEDURE — 93010 ELECTROCARDIOGRAM REPORT: CPT | Mod: ,,, | Performed by: INTERNAL MEDICINE

## 2024-03-05 PROCEDURE — 33361 REPLACE AORTIC VALVE PERQ: CPT | Mod: Q0 | Performed by: STUDENT IN AN ORGANIZED HEALTH CARE EDUCATION/TRAINING PROGRAM

## 2024-03-05 PROCEDURE — 25500020 PHARM REV CODE 255: Performed by: STUDENT IN AN ORGANIZED HEALTH CARE EDUCATION/TRAINING PROGRAM

## 2024-03-05 PROCEDURE — 63600175 PHARM REV CODE 636 W HCPCS: Performed by: NURSE ANESTHETIST, CERTIFIED REGISTERED

## 2024-03-05 PROCEDURE — 25000003 PHARM REV CODE 250: Performed by: NURSE ANESTHETIST, CERTIFIED REGISTERED

## 2024-03-05 PROCEDURE — 02RF38Z REPLACEMENT OF AORTIC VALVE WITH ZOOPLASTIC TISSUE, PERCUTANEOUS APPROACH: ICD-10-PCS | Performed by: THORACIC SURGERY (CARDIOTHORACIC VASCULAR SURGERY)

## 2024-03-05 PROCEDURE — 21400001 HC TELEMETRY ROOM

## 2024-03-05 PROCEDURE — D9220A PRA ANESTHESIA: Mod: Q0,CRNA,,

## 2024-03-05 DEVICE — 18F MANTA VASCULAR CLOSURE DEVICE
Type: IMPLANTABLE DEVICE | Site: GROIN | Status: FUNCTIONAL
Brand: MANTA

## 2024-03-05 DEVICE — IMPLANTABLE DEVICE: Type: IMPLANTABLE DEVICE | Site: AORTA | Status: FUNCTIONAL

## 2024-03-05 RX ORDER — ONDANSETRON HYDROCHLORIDE 2 MG/ML
4 INJECTION, SOLUTION INTRAVENOUS EVERY 6 HOURS PRN
Status: DISCONTINUED | OUTPATIENT
Start: 2024-03-05 | End: 2024-03-06 | Stop reason: HOSPADM

## 2024-03-05 RX ORDER — LIDOCAINE HYDROCHLORIDE 10 MG/ML
INJECTION, SOLUTION EPIDURAL; INFILTRATION; INTRACAUDAL; PERINEURAL
Status: DISCONTINUED | OUTPATIENT
Start: 2024-03-05 | End: 2024-03-05 | Stop reason: HOSPADM

## 2024-03-05 RX ORDER — MIDAZOLAM HYDROCHLORIDE 1 MG/ML
INJECTION INTRAMUSCULAR; INTRAVENOUS
Status: DISCONTINUED | OUTPATIENT
Start: 2024-03-05 | End: 2024-03-05

## 2024-03-05 RX ORDER — EPHEDRINE SULFATE 50 MG/ML
INJECTION, SOLUTION INTRAVENOUS
Status: DISCONTINUED | OUTPATIENT
Start: 2024-03-05 | End: 2024-03-05

## 2024-03-05 RX ORDER — GLYCOPYRROLATE 0.2 MG/ML
INJECTION INTRAMUSCULAR; INTRAVENOUS
Status: DISCONTINUED | OUTPATIENT
Start: 2024-03-05 | End: 2024-03-05

## 2024-03-05 RX ORDER — LIDOCAINE HYDROCHLORIDE 20 MG/ML
INJECTION, SOLUTION EPIDURAL; INFILTRATION; INTRACAUDAL; PERINEURAL
Status: DISCONTINUED | OUTPATIENT
Start: 2024-03-05 | End: 2024-03-05

## 2024-03-05 RX ORDER — HYDROMORPHONE HYDROCHLORIDE 2 MG/ML
0.2 INJECTION, SOLUTION INTRAMUSCULAR; INTRAVENOUS; SUBCUTANEOUS EVERY 5 MIN PRN
Status: DISCONTINUED | OUTPATIENT
Start: 2024-03-05 | End: 2024-03-05 | Stop reason: HOSPADM

## 2024-03-05 RX ORDER — ONDANSETRON HYDROCHLORIDE 2 MG/ML
4 INJECTION, SOLUTION INTRAVENOUS DAILY PRN
Status: DISCONTINUED | OUTPATIENT
Start: 2024-03-05 | End: 2024-03-05 | Stop reason: HOSPADM

## 2024-03-05 RX ORDER — PROTAMINE SULFATE 10 MG/ML
INJECTION, SOLUTION INTRAVENOUS
Status: DISCONTINUED | OUTPATIENT
Start: 2024-03-05 | End: 2024-03-05

## 2024-03-05 RX ORDER — ETOMIDATE 2 MG/ML
INJECTION INTRAVENOUS
Status: DISCONTINUED | OUTPATIENT
Start: 2024-03-05 | End: 2024-03-05

## 2024-03-05 RX ORDER — HEPARIN SODIUM 1000 [USP'U]/ML
INJECTION, SOLUTION INTRAVENOUS; SUBCUTANEOUS
Status: DISCONTINUED | OUTPATIENT
Start: 2024-03-05 | End: 2024-03-05

## 2024-03-05 RX ORDER — SODIUM CHLORIDE 9 MG/ML
INJECTION, SOLUTION INTRAVENOUS CONTINUOUS
Status: ACTIVE | OUTPATIENT
Start: 2024-03-05 | End: 2024-03-05

## 2024-03-05 RX ORDER — PHENYLEPHRINE HCL IN 0.9% NACL 1 MG/10 ML
SYRINGE (ML) INTRAVENOUS
Status: DISCONTINUED | OUTPATIENT
Start: 2024-03-05 | End: 2024-03-05

## 2024-03-05 RX ORDER — SODIUM CHLORIDE 0.9 % (FLUSH) 0.9 %
10 SYRINGE (ML) INJECTION
Status: DISCONTINUED | OUTPATIENT
Start: 2024-03-05 | End: 2024-03-05 | Stop reason: HOSPADM

## 2024-03-05 RX ORDER — DIPHENHYDRAMINE HYDROCHLORIDE 50 MG/ML
25 INJECTION INTRAMUSCULAR; INTRAVENOUS EVERY 6 HOURS PRN
Status: DISCONTINUED | OUTPATIENT
Start: 2024-03-05 | End: 2024-03-05 | Stop reason: HOSPADM

## 2024-03-05 RX ORDER — ACETAMINOPHEN 325 MG/1
650 TABLET ORAL EVERY 4 HOURS PRN
Status: DISCONTINUED | OUTPATIENT
Start: 2024-03-05 | End: 2024-03-06 | Stop reason: HOSPADM

## 2024-03-05 RX ORDER — MORPHINE SULFATE 4 MG/ML
2 INJECTION, SOLUTION INTRAMUSCULAR; INTRAVENOUS
Status: DISCONTINUED | OUTPATIENT
Start: 2024-03-05 | End: 2024-03-06 | Stop reason: HOSPADM

## 2024-03-05 RX ORDER — OXYCODONE HYDROCHLORIDE 5 MG/1
5 TABLET ORAL
Status: DISCONTINUED | OUTPATIENT
Start: 2024-03-05 | End: 2024-03-05 | Stop reason: HOSPADM

## 2024-03-05 RX ORDER — MUPIROCIN 20 MG/G
OINTMENT TOPICAL DAILY
Status: DISCONTINUED | OUTPATIENT
Start: 2024-03-05 | End: 2024-03-05 | Stop reason: HOSPADM

## 2024-03-05 RX ORDER — HALOPERIDOL 5 MG/ML
0.5 INJECTION INTRAMUSCULAR EVERY 10 MIN PRN
Status: DISCONTINUED | OUTPATIENT
Start: 2024-03-05 | End: 2024-03-05 | Stop reason: HOSPADM

## 2024-03-05 RX ORDER — ASPIRIN 81 MG/1
81 TABLET ORAL DAILY
Status: DISCONTINUED | OUTPATIENT
Start: 2024-03-06 | End: 2024-03-06 | Stop reason: HOSPADM

## 2024-03-05 RX ORDER — HYDRALAZINE HYDROCHLORIDE 20 MG/ML
10 INJECTION INTRAMUSCULAR; INTRAVENOUS EVERY 4 HOURS PRN
Status: DISCONTINUED | OUTPATIENT
Start: 2024-03-05 | End: 2024-03-06 | Stop reason: HOSPADM

## 2024-03-05 RX ORDER — HYDROCODONE BITARTRATE AND ACETAMINOPHEN 5; 325 MG/1; MG/1
1 TABLET ORAL EVERY 4 HOURS PRN
Status: DISCONTINUED | OUTPATIENT
Start: 2024-03-05 | End: 2024-03-06 | Stop reason: HOSPADM

## 2024-03-05 RX ORDER — CLOPIDOGREL BISULFATE 75 MG/1
75 TABLET ORAL DAILY
Status: DISCONTINUED | OUTPATIENT
Start: 2024-03-06 | End: 2024-03-06 | Stop reason: HOSPADM

## 2024-03-05 RX ADMIN — MUPIROCIN: 20 OINTMENT TOPICAL at 12:03

## 2024-03-05 RX ADMIN — ETOMIDATE 20 MG: 2 INJECTION INTRAVENOUS at 01:03

## 2024-03-05 RX ADMIN — HEPARIN SODIUM 7000 UNITS: 1000 INJECTION, SOLUTION INTRAVENOUS; SUBCUTANEOUS at 01:03

## 2024-03-05 RX ADMIN — Medication 100 MCG: at 02:03

## 2024-03-05 RX ADMIN — LIDOCAINE HYDROCHLORIDE 60 MG: 20 INJECTION, SOLUTION EPIDURAL; INFILTRATION; INTRACAUDAL; PERINEURAL at 01:03

## 2024-03-05 RX ADMIN — EPHEDRINE SULFATE 10 MG: 50 INJECTION INTRAVENOUS at 01:03

## 2024-03-05 RX ADMIN — SODIUM CHLORIDE, SODIUM GLUCONATE, SODIUM ACETATE, POTASSIUM CHLORIDE AND MAGNESIUM CHLORIDE: 526; 502; 368; 37; 30 INJECTION, SOLUTION INTRAVENOUS at 01:03

## 2024-03-05 RX ADMIN — MIDAZOLAM 2 MG: 1 INJECTION INTRAMUSCULAR; INTRAVENOUS at 01:03

## 2024-03-05 RX ADMIN — GLYCOPYRROLATE 0.4 MG: 0.2 INJECTION INTRAMUSCULAR; INTRAVENOUS at 01:03

## 2024-03-05 RX ADMIN — EPHEDRINE SULFATE 10 MG: 50 INJECTION INTRAVENOUS at 02:03

## 2024-03-05 RX ADMIN — DEXTROSE MONOHYDRATE 1.5 G: 5 INJECTION INTRAVENOUS at 01:03

## 2024-03-05 RX ADMIN — Medication 100 MCG: at 01:03

## 2024-03-05 RX ADMIN — SODIUM CHLORIDE: 9 INJECTION, SOLUTION INTRAVENOUS at 05:03

## 2024-03-05 RX ADMIN — PROTAMINE SULFATE 70 MG: 10 INJECTION, SOLUTION INTRAVENOUS at 02:03

## 2024-03-05 NOTE — BRIEF OP NOTE
[facility] - Cath Lab Services  Brief Operative Note    SUMMARY     Surgery Date: 3/5/2024     Surgeon(s) and Role:     * Trevor Tesfaye Jr., MD - Primary     * Mac Renae MD    Assisting Surgeon: None    Pre-op Diagnosis:  Aortic stenosis [I35.0]    Post-op Diagnosis:  Post-Op Diagnosis Codes:     * Aortic stenosis [I35.0]    Procedure(s) (LRB):  REPLACEMENT, AORTIC VALVE, TRANSCATHETER (TAVR) (N/A)    Anesthesia: General/MAC    Implants:  Implant Name Type Inv. Item Serial No.  Lot No. LRB No. Used Action   [employer] IVANA 3 Ultra THV 26mm   [id number]   N/A 1 Implanted   DEVICE MANTA CLOSURE 18FR - [id number]  DEVICE MANTA CLOSURE 18FR  TELEFLEX [id number] Right 1 Implanted       Operative Findings: successful 26 mm Ivana S3 TAVR from a R femoral approach    Estimated Blood Loss: * No values recorded between 3/5/2024  1:36 PM and 3/5/2024  2:16 PM *    Estimated Blood Loss has been documented.         Specimens:   Specimen (24h ago, onward)      None            [id number]

## 2024-03-05 NOTE — Clinical Note
The catheter was inserted in the right ventricle. The transvenous pacing lead was inserted into the RV and was placed and capture was confirmed. The pacer was paced at 40 BPM 10 mA 2 mV.

## 2024-03-05 NOTE — NURSING
Nurses Note -- 4 Eyes      3/5/2024   5:41 PM      Skin assessed during: Admit      [x] No Altered Skin Integrity Present    [x]Prevention Measures Documented      [] Yes- Altered Skin Integrity Present or Discovered   [] LDA Added if Not in Epic (Describe Wound)   [] New Altered Skin Integrity was Present on Admit and Documented in LDA   [] Wound Image Taken    Wound Care Consulted? No    Attending Nurse:  Mile hernandez RN     Second RN/Staff Member:  MARIN Vernon

## 2024-03-05 NOTE — ANESTHESIA PREPROCEDURE EVALUATION
03/05/2024  Jose Alejandro Minor is a 78 y.o., male.        2/20/24 Cath:    Distal Left Main has a 90% stenosis.    Ostial Left Anterior Descending is chronically 100% occluded.    Mid Left Circumflex is chronically 100% occluded. The distal vessel is filled via L-to-L (diagonal branch) and R to L collaterals (RPLB).    First Obtuse Marginal Branch has a 80% stenosis.    Mid Right Coronary Artery has a 50-60% stenosis.    Distal RPLB has a 90% stenosis.    LIMA-LAD graft is widely patent.    There are 3 prior SVGs, all of which appear to be chronically occluded.    RHC with normal to mildly elevated R sided filling pressures. RA 8 mmHg, PA 34/17 mmHg (mean 23 mmHg), PCWP 17 mmHg.    Transpulmonary gradient is 6 mmHg.    Normal Cardiac Output/Index at 7.3/3.6, as calculated by Lisa equation.    PVR is 0.82 Anguiano units    SVR is 789 dyne-sec*cm^-5    Pulmonary Artery Pulsatility Index (Lakisha) is 2.1    Cardiac Power Output () is 1.3    2/16/24 TTE:      Pre-op Assessment    I have reviewed the Patient Summary Reports.     I have reviewed the Nursing Notes. I have reviewed the NPO Status.   I have reviewed the Medications.     Review of Systems  Cardiovascular:  Exercise tolerance: poor   Hypertension Valvular problems/Murmurs, AS Past MI CAD   CABG/stent                                     Hepatic/GI:   PUD,  GERD             Musculoskeletal:  Arthritis               Neurological:    Neuromuscular Disease,       Parkinson's disease                            Psych:    depression                Physical Exam  General: Cooperative, Alert and Oriented    Airway:  Mallampati: II   Mouth Opening: Normal  TM Distance: Normal  Tongue: Normal  Neck ROM: Normal ROM    Dental:  Edentulous        Anesthesia Plan  Type of Anesthesia, risks & benefits discussed:    Anesthesia Type: Gen Supraglottic Airway  Intra-op  Monitoring Plan: Standard ASA Monitors and Art Line  Post Op Pain Control Plan: multimodal analgesia  Induction:  IV  Informed Consent: Informed consent signed with the Patient and all parties understand the risks and agree with anesthesia plan.  All questions answered. Patient consented to blood products? Yes  ASA Score: 3  Day of Surgery Review of History & Physical: H&P Update referred to the surgeon/provider.I have interviewed and examined the patient. I have reviewed the patient's H&P dated: There are no significant changes.     Ready For Surgery From Anesthesia Perspective.     .

## 2024-03-05 NOTE — Clinical Note
The catheter was inserted into the left ventricle. Used stiff straight glidewire to cross aortic valve, exchanged for 0.035x260 safari wire

## 2024-03-05 NOTE — TRANSFER OF CARE
"Anesthesia Transfer of Care Note    Patient: Jose Alejandro Minor    Procedure(s) Performed: Procedure(s) (LRB):  REPLACEMENT, AORTIC VALVE, TRANSCATHETER (TAVR) (N/A)    Patient location: PACU    Anesthesia Type: general    Transport from OR: Transported from OR on room air with adequate spontaneous ventilation    Post pain: adequate analgesia    Post assessment: no apparent anesthetic complications and tolerated procedure well    Post vital signs: stable    Level of consciousness: awake and alert    Nausea/Vomiting: no nausea/vomiting    Complications: none    Transfer of care protocol was followed      Last vitals: Visit Vitals  BP (!) 145/81   Pulse (!) 193   Temp 36.9 °C (98.4 °F) (Oral)   Ht 5' 8" (1.727 m)   Wt 86 kg (189 lb 9.5 oz)   SpO2 95%   BMI 28.83 kg/m²     "

## 2024-03-05 NOTE — Clinical Note
The catheter was inserted into the aorta. An angiography was performed of the aorta. Multiple views were taken. The angiography was performed via power injection.  Aortic root angio performed

## 2024-03-05 NOTE — TRANSFER OF CARE
Anesthesia Transfer of Care Note    Patient: Jose Alejandro Minor    Procedure(s) Performed: Procedure(s) (LRB):  REPLACEMENT, AORTIC VALVE, TRANSCATHETER (TAVR) (N/A)    Patient location: PACU    Transport from OR: Transported from OR on room air with adequate spontaneous ventilation    Post pain: adequate analgesia    Post assessment: no apparent anesthetic complications and tolerated procedure well    Post vital signs: stable    Level of consciousness: awake and alert    Nausea/Vomiting: no nausea/vomiting    Complications: none    Transfer of care protocol was followed      Last vitals:

## 2024-03-05 NOTE — PROGRESS NOTES
Patient seen in PACU with mild swelling and small hematoma noted to R femoral site with Sandbag over site. No active bleeding noted to site. Dr. Rodgers assessed will continue to monitor closely. Please call and update cardiology with any changes.

## 2024-03-05 NOTE — ANESTHESIA PROCEDURE NOTES
Intubation    Date/Time: 3/5/2024 1:27 PM    Performed by: Luis Zurita CRNA  Authorized by: Rayray Phelps MD    Intubation:     Induction:  Intravenous    Intubated:  Postinduction    Mask Ventilation:  Easy with oral airway    Attempts:  1    Attempted By:  CRNA    Difficult Airway Encountered?: No      Airway Device:  Supraglottic airway/LMA    Airway Device Size:  4.0    Style/Cuff Inflation:  Cuffed (inflated to minimal occlusive pressure)    Inflation Amount (mL):  18    Placement Verified By:  Capnometry    Complicating Factors:  None    Findings Post-Intubation:  BS equal bilateral

## 2024-03-05 NOTE — ANESTHESIA POSTPROCEDURE EVALUATION
Anesthesia Post Evaluation    Patient: Jose Alejandro Minor    Procedure(s) Performed: Procedure(s) (LRB):  REPLACEMENT, AORTIC VALVE, TRANSCATHETER (TAVR) (N/A)    Final Anesthesia Type: general      Patient location during evaluation: PACU  Patient participation: Yes- Able to Participate  Level of consciousness: awake  Post-procedure vital signs: reviewed and stable  Pain management: adequate  Airway patency: patent  ALFONSO mitigation strategies: Multimodal analgesia  PONV status at discharge: No PONV  Anesthetic complications: no      Cardiovascular status: blood pressure returned to baseline  Respiratory status: unassisted  Hydration status: euvolemic  Follow-up not needed.              Vitals Value Taken Time   /76 03/05/24 1506   Temp 97.6 03/05/24 1508   Pulse 71 03/05/24 1508   Resp 12 03/05/24 1508   SpO2 97 % 03/05/24 1508   Vitals shown include unvalidated device data.      No case tracking events are documented in the log.      Pain/Corie Score: Corie Score: 10 (3/5/2024  1:09 PM)

## 2024-03-06 LAB
ANION GAP SERPL CALC-SCNC: 7 MEQ/L
AV INDEX (PROSTH): 0.5
AV MEAN GRADIENT: 10 MMHG
AV MEAN GRADIENT: 21 MMHG
AV PEAK GRADIENT: 19 MMHG
AV PEAK GRADIENT: 30 MMHG
AV VALVE AREA BY VELOCITY RATIO: 1.56 CM²
AV VALVE AREA: 1.71 CM²
AV VELOCITY RATIO: 0.45
BASOPHILS # BLD AUTO: 0.05 X10(3)/MCL
BASOPHILS NFR BLD AUTO: 0.4 %
BSA FOR ECHO PROCEDURE: 2.03 M2
BSA FOR ECHO PROCEDURE: 2.03 M2
BUN SERPL-MCNC: 14.7 MG/DL (ref 8.4–25.7)
CALCIUM SERPL-MCNC: 8.5 MG/DL (ref 8.8–10)
CHLORIDE SERPL-SCNC: 101 MMOL/L (ref 98–107)
CO2 SERPL-SCNC: 28 MMOL/L (ref 23–31)
CREAT SERPL-MCNC: 0.73 MG/DL (ref 0.73–1.18)
CREAT/UREA NIT SERPL: 20
CV ECHO LV RWT: 0.51 CM
DOP CALC AO PEAK VEL: 2.17 M/S
DOP CALC AO PEAK VEL: 2.72 M/S
DOP CALC AO VTI: 42.2 CM
DOP CALC AO VTI: 72.2 CM
DOP CALC LVOT AREA: 3.5 CM2
DOP CALC LVOT DIAMETER: 2.1 CM
DOP CALC LVOT PEAK VEL: 0.98 M/S
DOP CALC LVOT STROKE VOLUME: 72.35 CM3
DOP CALCLVOT PEAK VEL VTI: 20.9 CM
ECHO LV POSTERIOR WALL: 1.27 CM (ref 0.6–1.1)
EOSINOPHIL # BLD AUTO: 0.08 X10(3)/MCL (ref 0–0.9)
EOSINOPHIL NFR BLD AUTO: 0.7 %
ERYTHROCYTE [DISTWIDTH] IN BLOOD BY AUTOMATED COUNT: 14.8 % (ref 11.5–17)
FRACTIONAL SHORTENING: 29 % (ref 28–44)
GFR SERPLBLD CREATININE-BSD FMLA CKD-EPI: >60 MLS/MIN/1.73/M2
GLUCOSE SERPL-MCNC: 110 MG/DL (ref 82–115)
HCT VFR BLD AUTO: 37.8 % (ref 42–52)
HGB BLD-MCNC: 12.3 G/DL (ref 14–18)
IMM GRANULOCYTES # BLD AUTO: 0.07 X10(3)/MCL (ref 0–0.04)
IMM GRANULOCYTES NFR BLD AUTO: 0.6 %
INTERVENTRICULAR SEPTUM: 1.21 CM (ref 0.6–1.1)
LEFT INTERNAL DIMENSION IN SYSTOLE: 3.57 CM (ref 2.1–4)
LEFT VENTRICLE DIASTOLIC VOLUME INDEX: 59.2 ML/M2
LEFT VENTRICLE DIASTOLIC VOLUME: 119 ML
LEFT VENTRICLE MASS INDEX: 123 G/M2
LEFT VENTRICLE SYSTOLIC VOLUME INDEX: 26.5 ML/M2
LEFT VENTRICLE SYSTOLIC VOLUME: 53.3 ML
LEFT VENTRICULAR INTERNAL DIMENSION IN DIASTOLE: 5.02 CM (ref 3.5–6)
LEFT VENTRICULAR MASS: 246.35 G
LVOT MG: 2 MMHG
LVOT MV: 0.66 CM/S
LYMPHOCYTES # BLD AUTO: 1.14 X10(3)/MCL (ref 0.6–4.6)
LYMPHOCYTES NFR BLD AUTO: 10.1 %
MCH RBC QN AUTO: 31.7 PG (ref 27–31)
MCHC RBC AUTO-ENTMCNC: 32.5 G/DL (ref 33–36)
MCV RBC AUTO: 97.4 FL (ref 80–94)
MONOCYTES # BLD AUTO: 1.25 X10(3)/MCL (ref 0.1–1.3)
MONOCYTES NFR BLD AUTO: 11 %
NEUTROPHILS # BLD AUTO: 8.74 X10(3)/MCL (ref 2.1–9.2)
NEUTROPHILS NFR BLD AUTO: 77.2 %
NRBC BLD AUTO-RTO: 0 %
OHS LV EJECTION FRACTION SIMPSONS BIPLANE MOD: 55 %
OHS QRS DURATION: 88 MS
OHS QTC CALCULATION: 444 MS
PLATELET # BLD AUTO: 148 X10(3)/MCL (ref 130–400)
PMV BLD AUTO: 11.5 FL (ref 7.4–10.4)
POCT GLUCOSE: 120 MG/DL (ref 70–110)
POTASSIUM SERPL-SCNC: 4 MMOL/L (ref 3.5–5.1)
RBC # BLD AUTO: 3.88 X10(6)/MCL (ref 4.7–6.1)
SODIUM SERPL-SCNC: 136 MMOL/L (ref 136–145)
WBC # SPEC AUTO: 11.33 X10(3)/MCL (ref 4.5–11.5)
Z-SCORE OF LEFT VENTRICULAR DIMENSION IN END DIASTOLE: -1.6
Z-SCORE OF LEFT VENTRICULAR DIMENSION IN END SYSTOLE: -0.1

## 2024-03-06 PROCEDURE — 80048 BASIC METABOLIC PNL TOTAL CA: CPT | Performed by: STUDENT IN AN ORGANIZED HEALTH CARE EDUCATION/TRAINING PROGRAM

## 2024-03-06 PROCEDURE — 93010 ELECTROCARDIOGRAM REPORT: CPT | Mod: ,,, | Performed by: INTERNAL MEDICINE

## 2024-03-06 PROCEDURE — 94761 N-INVAS EAR/PLS OXIMETRY MLT: CPT

## 2024-03-06 PROCEDURE — 27000221 HC OXYGEN, UP TO 24 HOURS

## 2024-03-06 PROCEDURE — 25000003 PHARM REV CODE 250: Performed by: STUDENT IN AN ORGANIZED HEALTH CARE EDUCATION/TRAINING PROGRAM

## 2024-03-06 PROCEDURE — 63600175 PHARM REV CODE 636 W HCPCS: Performed by: STUDENT IN AN ORGANIZED HEALTH CARE EDUCATION/TRAINING PROGRAM

## 2024-03-06 PROCEDURE — 85025 COMPLETE CBC W/AUTO DIFF WBC: CPT | Performed by: STUDENT IN AN ORGANIZED HEALTH CARE EDUCATION/TRAINING PROGRAM

## 2024-03-06 PROCEDURE — 93005 ELECTROCARDIOGRAM TRACING: CPT

## 2024-03-06 RX ORDER — ASPIRIN 81 MG/1
81 TABLET ORAL DAILY
Qty: 30 TABLET | Refills: 11 | Status: SHIPPED | OUTPATIENT
Start: 2024-03-06 | End: 2025-03-06

## 2024-03-06 RX ADMIN — CLOPIDOGREL BISULFATE 75 MG: 75 TABLET ORAL at 08:03

## 2024-03-06 RX ADMIN — DEXTROSE MONOHYDRATE 1.5 G: 5 INJECTION INTRAVENOUS at 01:03

## 2024-03-06 RX ADMIN — Medication 81 MG: at 08:03

## 2024-03-06 NOTE — PLAN OF CARE
03/06/24 0847   Discharge Assessment   Assessment Type Discharge Planning Assessment   Confirmed/corrected address, phone number and insurance Yes   Confirmed Demographics Correct on Facesheet   Source of Information patient   When was your last doctors appointment? 02/06/24   Does patient/caregiver understand observation status Yes   Communicated [place] with patient/caregiver Yes   Reason For Admission aortic stenosis   People in Home alone  (lives alone but reports that he has 2 daughters that are close by to his home)   Facility Arrived From: home   Do you expect to return to your current living situation? Yes   Do you have help at home or someone to help you manage your care at home? Yes   Who are your caregiver(s) and their phone number(s)? daughters   Prior to hospitilization cognitive status: Alert/Oriented   Current cognitive status: Alert/Oriented   Walking or Climbing Stairs Difficulty yes   Mobility Management walker use prn   Dressing/Bathing Difficulty no   Home Layout Able to live on 1st floor   Equipment Currently Used at Home walker, rolling   Readmission within 30 days? No   Patient currently being followed by outpatient case management? No   Do you currently have service(s) that help you manage your care at home? No   Do you take prescription medications? Yes   Do you have any problems affording any of your prescribed medications? No   Is the patient taking medications as prescribed? yes   Who is going to help you get home at discharge? daughters   How do you get to doctors appointments? car, drives self   Are you on dialysis? No   Discharge Plan A Home   Discharge Plan B Home Health   DME Needed Upon Discharge  none   Discharge Plan discussed with: Patient   Transition of Care Barriers None   Housing Stability   In the last 12 months, was there a time when you were not able to pay the mortgage or rent on time? N   In the last 12 months, was there a time when you did not have a steady place to  sleep or slept in a shelter (including now)? N   Transportation Needs   In the past 12 months, has lack of transportation kept you from medical appointments or from getting medications? no   In the past 12 months, has lack of transportation kept you from meetings, work, or from getting things needed for daily living? No   Food Insecurity   Within the past 12 months, you worried that your food would run out before you got the money to buy more. Never true   Within the past 12 months, the food you bought just didn't last and you didn't have money to get more. Never true   Alcohol Use   Q2: How many drinks containing alcohol do you have on a typical day when you are drinking? None     Pt reports that he has not used HH services, uses walker prn.

## 2024-03-06 NOTE — NURSING
Pt AAOx4, VSS, educated on discharge instructions, new medications, follow up appointments, and signs and symptoms to return to the ED with. All questions answered. Pt verbalized understanding. Peripheral IV discontinued and gauze/tape applied to site with no signs of bleeding or swelling noted. Telemetry monitor discontinued and returned to box. Pt wheeled down via transport. Pt denies any further needs.

## 2024-03-06 NOTE — OP NOTE
[facility] - [facility] Medical Telemetry  Cardiothoracic Surgery  Operative Note    SUMMARY     Date of Procedure: 3/5/2024     Procedure:  1. Transcatheter aortic valve replacement using a 26 mm Navarro Ivana S3 transcatheter aortic valve   2. Right common femoral artery mantle closure with an 18 Turkmen mantle closure device     Co-Surgeon: MADI Renae / Brien    Assistant:  None    Pre-Operative Diagnosis:  Severe, symptomatic aortic stenosis    Post-Operative Diagnosis:  Same    Anesthesia: General/MAC    Operative Findings (including complications, if any):  Dictated    Description of Technical Procedures: Patient was delivered to the cath lab, and prepped and draped in the usual sterile fashion.  Bilateral common femoral artery access was obtained with micropuncture access kit and ultrasound guidance.  Catheters and wires were then directed to the aortic root under fluoroscopic guidance.  A pigtail catheter was placed in the appropriate coronary cusp and a diagnostic aortogram was performed.  The working sheath was then placed in the femoral artery and An AL-1 guide was then passed over a J-wire and positioned at the outflow for the aortic valve.  Straight wire was then passed across the aortic valve and the catheter was then advanced into the left ventricle.  The stiff wire was then placed into the ventricle under fluoroscopic guidance and positioned appropriately.  The transcatheter aortic valve was device was then prepared and passed over the wire into the aortic valve annulus.  Under rapid ventricular pacing the valve was deployed without incident.  The delivery system was removed.  Completion aortography was performed.  The pigtail catheter was then pulled to the aortic bifurcation.  The large working sheath was then removed and a Manta closure device was then deployed in the usual fashion.  Completion aortography confirmed there to be no extravasation from the femoral artery.   Sterile dressings were applied.  The patient tolerated the procedure well will be delivered to the recovery room in stable condition.     Estimated Blood Loss (EBL):  10 mL          Specimens:   Specimen (24h ago, onward)      None                    Condition: Stable    Disposition: PACU - hemodynamically stable.

## 2024-03-06 NOTE — DISCHARGE SUMMARY
[facility] - [facility] Telemetry    Cardiology  Discharge Summary      Patient Name: Jose Alejandro Minor  MRN: [id number]  Admission Date: 3/5/2024  Hospital Length of Stay: 1 days  Discharge Date and Time:  03/06/2024 3:12 PM  Attending Physician: Trevor Tesfaye Jr., MD  Discharging Provider: KEESHA Granado  Primary Care Physician: Janna, Primary Doctor    HPI/Hospital Course: Mr. Minor is a 77 y/o male with a history of VHD, HTN, CAD s/p CABG, ASCVD, who is known to [facility], Dr. Tesfaye. He [facility] to the [facility] on 3.5.24 for a scheduled TAVR. He underwent a successful TAVR with an Navarro 26 mm Ivana S3 valve.          PMH: VHD, HTN, CAD s/p CABG, ASCVD, Parkinson's Disease   PSH: Partial Hip Arthoplasty, CABG, LHC  Family History:Father - MU, CAD; Mother -CVA; Brother - CABG, CAD  Social History:     Previous Cardiac Diagnostics:  Limited ECHO (3.6.24):  Left Ventricle: The left ventricle is mildly dilated. Mildly increased wall thickness. There is normal systolic function with a visually estimated ejection fraction of 55 - 60%. Aortic Valve: There is a transcatheter valve replacement in the aortic position. There is no stenosis. Aortic valve area by VTI is 1.71 cm². Aortic valve peak velocity is 2.17 m/s. Mean gradient is 10 mmHg. The dimensionless index is 0.50. There is no significant regurgitation. No paravalvular regurgitation.    TAVR (3.5.24):  Successful TAVR with an Navarro 26 mm Ivana S3 valve     Procedure(s) (LRB):  REPLACEMENT, AORTIC VALVE, TRANSCATHETER (TAVR) (N/A)   Consults:   Final Active Diagnoses:    Diagnosis Date Noted POA    PRINCIPAL PROBLEM:  Aortic stenosis [I35.0] 02/29/2024 Yes      Problems Resolved During this Admission:     Discharged Condition: stable  Review of Systems   Cardiovascular:  Negative for chest pain, dyspnea on exertion, leg swelling and palpitations.   Respiratory:  Negative for cough and shortness of breath.    All other systems reviewed and  are negative.    Physical Exam  Vitals and nursing note reviewed.   HENT:      Head: Normocephalic.      Nose: Nose normal.      Mouth/Throat:      Mouth: Mucous membranes are moist.   Eyes:      Pupils: Pupils are equal, round, and reactive to light.   Cardiovascular:      Rate and Rhythm: Normal rate and regular rhythm.      Pulses: Normal pulses.      Heart sounds: Normal heart sounds.   Pulmonary:      Effort: Pulmonary effort is normal.      Breath sounds: Normal breath sounds.   Abdominal:      General: Bowel sounds are normal.      Palpations: Abdomen is soft.   Musculoskeletal:         General: Normal range of motion.      Cervical back: Normal range of motion.   Skin:     General: Skin is warm.      Comments: R Groin Soft/Flat, Non-Tender, No Sign of Bleed/Infection. Dermabond Noted. +2 BLE Palpable Pedal Pulses    L Groin Soft/Flat, Non-Tender, Hematoma noted. No Sign of Bleed/Infection. +2 BLE Palpable Pedal Pulses     Neurological:      Mental Status: He is alert and oriented to person, place, and time.   Psychiatric:         Mood and Affect: Mood normal.         Behavior: Behavior normal.       Disposition: Home or Self Care  Follow Up:   Follow-up Information       Trevro Tesfaye Jr., MD Follow up on 3/22/2024.    Specialty: Cardiology  Why: @ 2:10PM  Contact information:  [address] [place]  [facility] [id number] [phone]                           Patient Instructions:      Diet Cardiac     Lifting restrictions   Order Comments: Do not lift anything greater than 5 pounds for 1 week     No driving until:   Order Comments: No driving for 1 week     Activity as tolerated     Shower on day dressing removed (No bath)   Order Comments: Showers only for 1 week     Medications:  Reconciled Home Medications:      Medication List        CHANGE how you take these medications      carbidopa-levodopa  mg  mg Tbsr  Commonly known as: SINEMET CR  Take 1 tablet by mouth 3 (three) times  daily.  What changed: Another medication with the same name was removed. Continue taking this medication, and follow the directions you see here.            CONTINUE taking these medications      aspirin 325 MG EC tablet  Commonly known as: ECOTRIN  Take 1 tablet by mouth every evening.     clopidogreL 75 mg tablet  Commonly known as: PLAVIX  Take 75 mg by mouth once daily.     DULoxetine 60 MG capsule  Commonly known as: CYMBALTA  Take 60 mg by mouth once daily.     hydroCHLOROthiazide 12.5 mg capsule  Commonly known as: MICROZIDE  Take 12.5 mg by mouth once daily.     IRON 100 PLUS Tab  Generic drug: iron-vit c-b12-folic acid  Take 1 tablet by mouth Daily.     Lactobacillus rhamnosus GG 10 billion cell capsule  Commonly known as: CULTURELLE  Take 1 capsule by mouth every evening.     lisinopriL 20 MG tablet  Commonly known as: PRINIVIL,ZESTRIL  Take 20 mg by mouth every evening.     MEN'S MULTIVITAMIN ORAL  Take 1 tablet by mouth every evening.     metoprolol succinate 25 MG 24 hr tablet  Commonly known as: TOPROL-XL  Take 25 mg by mouth 2 (two) times daily.     pantoprazole 40 MG tablet  Commonly known as: PROTONIX  Take 40 mg by mouth 2 (two) times daily.     pravastatin 80 MG tablet  Commonly known as: PRAVACHOL  Take 80 mg by mouth every evening.     QUEtiapine 50 mg Tb24  Commonly known as: SEROQUEL XR  Take 75 mg by mouth every evening.     ranolazine 1,000 mg Tb12  Commonly known as: RANEXA  Take 1,000 mg by mouth 2 (two) times daily.     VITAMIN C 500 MG tablet  Generic drug: ascorbic acid (vitamin C)  Take 500 mg by mouth 2 (two) times daily.     VITAMIN D3 50 mcg (2,000 unit) Tab  Generic drug: cholecalciferol (vitamin D3)  Take 1 tablet by mouth every evening.     zinc acetate 50 mg (zinc) Cap  Take 1 capsule by mouth every evening.            STOP taking these medications      isosorbide mononitrate 60 MG 24 hr tablet  Commonly known as: IMDUR     multivitamin with folic acid 400 mcg Tab      nitroGLYCERIN 0.4 MG SL tablet  Commonly known as: NITROSTAT     VITAMIN D-3 ORAL            Impression:  VHD    - s/p TAVR (3.5.24): Successful TAVR with an Navarro 26 mm Ivana S3 valve   CAD s/p CABG  HTN  ASCVD  Parkinson's Disease  No known history of GI Bleed     Plan:   Discharge Home  ECHO and EKG Reviewed  Continue ASA (81 mg) and Plavix   Hold Metoprolol for 48 hours  Groin Precautions    - Do not drive for 1 week    - Do not lift anything greater than 5 pounds for 1 week    - Showers only for 1 week    - Monitor for signs of infection/bleeding   Follow-up with Dr. Tesfaye in 1-2 weeks     Time spent on the discharge of patient: 34 minutes    KEESHA Granado  Cardiology  [facility] - [facility] Tele[facility]

## 2024-03-07 ENCOUNTER — PATIENT OUTREACH (OUTPATIENT)
Dept: ADMINISTRATIVE | Facility: CLINIC | Age: 79
End: 2024-03-07
Payer: MEDICARE

## 2024-03-07 LAB
ABO + RH BLD: NORMAL
ABO + RH BLD: NORMAL
BLD PROD TYP BPU: NORMAL
BLD PROD TYP BPU: NORMAL
BLOOD UNIT EXPIRATION DATE: NORMAL
BLOOD UNIT EXPIRATION DATE: NORMAL
BLOOD UNIT TYPE CODE: 1700
BLOOD UNIT TYPE CODE: 1700
CROSSMATCH INTERPRETATION: NORMAL
CROSSMATCH INTERPRETATION: NORMAL
DISPENSE STATUS: NORMAL
DISPENSE STATUS: NORMAL
UNIT NUMBER: NORMAL
UNIT NUMBER: NORMAL

## 2024-03-11 VITALS
BODY MASS INDEX: 29.27 KG/M2 | RESPIRATION RATE: 18 BRPM | OXYGEN SATURATION: 95 % | HEIGHT: 68 IN | SYSTOLIC BLOOD PRESSURE: 105 MMHG | HEART RATE: 70 BPM | TEMPERATURE: 99 F | DIASTOLIC BLOOD PRESSURE: 67 MMHG | WEIGHT: 193.13 LBS

## 2024-11-25 ENCOUNTER — TELEPHONE (OUTPATIENT)
Dept: NEUROLOGY | Facility: CLINIC | Age: 79
End: 2024-11-25
Payer: MEDICARE

## 2024-11-25 NOTE — TELEPHONE ENCOUNTER
Medication: SINEMET  MG    Pharmacy:   [facility] [place]  [address] [id number] [facility]  [place] [address]  Phone: [phone] Fax: [phone]       Last Appointment: 9/25/2023     Next Appointment: 1/8/2025     Call back number: [phone]

## 2024-11-25 NOTE — TELEPHONE ENCOUNTER
Rtn call to # provided  Advised per provider we are unable to refill medication until he is seen in office due to pt canceling last 2 appts.   We have not seen pt since 2023  Added pt to the wait list  Verbalized understanding

## 2025-01-07 PROBLEM — G20.A1 PARKINSON'S DISEASE, UNSPECIFIED WHETHER DYSKINESIA PRESENT, UNSPECIFIED WHETHER MANIFESTATIONS FLUCTUATE: Status: ACTIVE | Noted: 2025-01-07

## 2025-01-07 NOTE — PROGRESS NOTES
Subjective     Patient ID: Jose Alejandro Minor is a 79 y.o. male.    Chief Complaint: parkinsons    HPI  On sinemet CR bid  Started sinemet in 2020    LOV 2023  Taking sinemet CR 1-2x/day (0800 & 1800)  No falls  Moods good  No hallucinations/delusions  Appetite good  No b/b issues  He lives alone  Still drives locally w/o issues    Tongue dyskinesias  Worse since starting seroquel a year ago when he was admitted to rehab p hip sx  Not really bothersome    Here with daughter maribel    Review of Systems  A 14pt ROS was reviewed & is negative unless otherwise documented in the HPI       Objective     Physical Exam  GENERAL: NAD, calm, cooperative, appropriate  Awake/alert  Well groomed  RESP: CTAB  HEART: RRR  No LE edema  MENTAL STATUS: oriented, follow commands reliably  SPEECH/LANGUAGE: clear, fluent  CN:  Perrla, eomi, vff, gaze conjugate  No tactile or motor facial asymmetry  Tongue protrudes midline  Motor: bradykinetic  Mild rigidity, worse on L side  Cerebellar: bue rest tremor, R worse  Sensory: normal to tactile stim/vibration  DTRs: normal +2, symmetric  Gait: antalgic, tremor worse when ambulating       Assessment and Plan     1. Parkinson's disease, unspecified whether dyskinesia present, unspecified whether manifestations fluctuate  -     carbidopa-levodopa  mg (SINEMET CR)  mg TbSR; Take 1 tablet by mouth 3 (three) times daily.  Dispense: 270 tablet; Refill: 4      Try increasing sinemet CR  to TID consistently  F/u 1y    Gloria Kelly, [facility]     Follow up in about 1 year (around 1/8/2026) for PD.

## 2025-01-08 ENCOUNTER — OFFICE VISIT (OUTPATIENT)
Dept: NEUROLOGY | Facility: CLINIC | Age: 80
End: 2025-01-08
Payer: MEDICARE

## 2025-01-08 VITALS
WEIGHT: 193 LBS | BODY MASS INDEX: 29.25 KG/M2 | SYSTOLIC BLOOD PRESSURE: 110 MMHG | DIASTOLIC BLOOD PRESSURE: 70 MMHG | HEIGHT: 68 IN

## 2025-01-08 DIAGNOSIS — G20.A1 PARKINSON'S DISEASE, UNSPECIFIED WHETHER DYSKINESIA PRESENT, UNSPECIFIED WHETHER MANIFESTATIONS FLUCTUATE: Primary | ICD-10-CM

## 2025-01-08 PROCEDURE — 99999 PR PBB SHADOW E&M-EST. PATIENT-LVL IV: CPT | Mod: PBBFAC,,, | Performed by: NURSE PRACTITIONER

## 2025-01-08 PROCEDURE — 99214 OFFICE O/P EST MOD 30 MIN: CPT | Mod: PBBFAC | Performed by: NURSE PRACTITIONER

## 2025-01-08 PROCEDURE — 99214 OFFICE O/P EST MOD 30 MIN: CPT | Mod: S$PBB,,, | Performed by: NURSE PRACTITIONER

## 2025-01-08 RX ORDER — CARBIDOPA AND LEVODOPA 50; 200 MG/1; MG/1
1 TABLET, EXTENDED RELEASE ORAL 3 TIMES DAILY
Qty: 270 TABLET | Refills: 4 | Status: SHIPPED | OUTPATIENT
Start: 2025-01-08 | End: 2026-04-03

## 2025-01-08 RX ORDER — NITROGLYCERIN 0.4 MG/1
TABLET SUBLINGUAL
COMMUNITY
Start: 2024-10-04

## 2025-01-08 NOTE — PATIENT INSTRUCTIONS
"Patient Education       Parkinson Disease   The Basics   Written by the doctors and editors at [facility]   What is Parkinson disease? -- Parkinson disease is a brain disorder that affects movement. It gets worse over time and can affect other brain functions, too, such as learning and memory.   What are the symptoms of Parkinson disease? -- At first, Parkinson disease often causes only mild symptoms. As it gets worse, the symptoms can affect a person's ability to work or do everyday activities. When it becomes even more severe, people with the disease sometimes need help taking care of themselves.  Parkinson disease can make people:  Shake (doctors call this "tremor")  Move slowly  Become stiff or rigid  Lose their balance or have a hard time walking  Parkinson disease can also make some people:  Lose the ability to think clearly  At times, lose touch with reality or see things that aren't there (these are called "hallucinations")  Feel depressed, anxious, or less interested in everyday life  Have problems with sleep, such as insomnia (trouble falling or staying asleep) and daytime sleepiness  Feel tired  Lose the ability to smell  The disease can even cause problems such as constipation, sweating, trouble urinating, trouble swallowing, and sexual problems. Some people with Parkinson disease get something called "orthostatic hypotension." This is the medical term for a sudden drop in blood pressure that happens when a person stands up. This drop in blood pressure can make the person feel dizzy or lightheaded, or even pass out.  Is there a test for Parkinson disease? -- No, there is no test. But doctors can usually tell if a person has Parkinson disease based on their symptoms. Sometimes doctors use tests to make sure that the symptoms are not caused by something else.  How is Parkinson disease treated? -- There are several medicines that can improve the symptoms of Parkinson disease. Researchers are also studying " "drugs that might help keep Parkinson disease from getting worse. But for now, no treatment can cure the disease.  The medicines used to treat Parkinson disease symptoms can sometimes cause serious side effects. For this reason, people often start taking them only after their symptoms start to really bother them.  If you are thinking about treatment, ask your doctor or nurse to help you understand the risks and benefits of the medicines you might take. Here are some questions to ask your doctor that might help you decide what to do about treatment:  Which medicines would you suggest I take?  What are their side effects?  How much are my symptoms likely to improve with each medicine?  What happens if I do not take the medicine?  People with Parkinson disease that does not improve with other treatments can sometimes get a treatment called "deep brain stimulation" (also called "DBS"). People who get DBS must first have surgery to place wires into a part of the brain that helps control muscle movement. The wires are attached to a device that gets implanted under the skin, usually near the collarbone. It sends electrical signals to the brain to reduce abnormal movement.  Is there anything I can do on my own to feel better? -- Yes. You can:  Exercise or do physical therapy, so that your body is less affected by stiffness and other symptoms.  Join social support groups, where you can talk to other people who understand your situation.  Make your home safer, so that you are less likely to fall (figure 1). For example, get rid of loose rugs and clutter, and make sure all electrical cords are neatly tucked away.  If you still drive, have yourself tested to make sure it is safe for you to keep driving.  Learn about Parkinson disease and its treatment. That way you can be actively involved in your care.  All topics are updated as new evidence becomes available and our peer review process is complete.  This topic retrieved from " [facility] on: Sep 21, 2021.  Topic 20513 Version 8.0  Release: 29.4.2 - C29.263  © 2021 UpToDate, Inc. and/or its affiliates. All rights reserved.  figure 1: How to avoid falling at home     This picture shows some of the things that can cause a fall in your home. Look around and remove any loose rugs, electrical cords, clutter, or furniture that could trip you.  Graphic 63016 Version 1.0    Consumer Information Use and Disclaimer   This information is not specific medical advice and does not replace information you receive from your health care provider. This is only a brief summary of general information. It does NOT include all information about conditions, illnesses, injuries, tests, procedures, treatments, therapies, discharge instructions or life-style choices that may apply to you. You must talk with your health care provider for complete information about your health and treatment options. This information should not be used to decide whether or not to accept your health care provider's advice, instructions or recommendations. Only your health care provider has the knowledge and training to provide advice that is right for you. The use of this information is governed by the [employer] End User License Agreement, available at https://[email or web address].Tunessence/en/solutions/Vivolux/about/anastasiia.The use of [facility] content is governed by the [facility] Terms of Use. ©2021 UpToDate, Inc. All rights reserved.  Copyright   © 2021 UpToDate, Inc. and/or its affiliates. All rights reserved.

## (undated) DEVICE — CATH IMPULSE PIGTAIL 5FR 125CM

## (undated) DEVICE — CATH IMPULSE 5F 100CM FR4

## (undated) DEVICE — SET MICPUNC ACC STIFF CANNULA

## (undated) DEVICE — SHEATH DESTINATION 6F X 45CM

## (undated) DEVICE — SHEATH INTRODUCER 6FR 11CM

## (undated) DEVICE — WIRE GUIDE INQWIRE STR 180CM

## (undated) DEVICE — Device

## (undated) DEVICE — SYS WASTE FLD DISPOSAL 1400ML

## (undated) DEVICE — CATH AL 1.0 5/BX

## (undated) DEVICE — CATH IMPULSE LCB 100X.047 5FR

## (undated) DEVICE — PAD DEFIB CADENCE ADULT R2

## (undated) DEVICE — CATH 5FR MP 100CM

## (undated) DEVICE — TUBING INJ M/F 1000 PSI 20IN

## (undated) DEVICE — CATH SWAN GANZ STND 7FR

## (undated) DEVICE — SHEATH INTRODUCER 7FR 11CM

## (undated) DEVICE — GUIDEWIRE SAFARI2 XS CRV 275CM

## (undated) DEVICE — GUIDEWIRE STF .035X180CM STR

## (undated) DEVICE — SET ANGIO ACIST CVI ANGIOTOUCH

## (undated) DEVICE — SPONGE LAP XRAY DTECT 18X18IN

## (undated) DEVICE — KIT HAND CONTROL HIGH PRESSUR

## (undated) DEVICE — CATH IMPULSE AR2 5FR 100CM

## (undated) DEVICE — BOWL UTILITY BLUE 32OZ

## (undated) DEVICE — SHEATH PINNACLE 8FR

## (undated) DEVICE — GUIDEWIRE EMERALD 3MM 175X5CM

## (undated) DEVICE — CATH ELECTRODE BLLN 5FR 110CM

## (undated) DEVICE — KIT MINI STICK MAX 5F 21GX10CM

## (undated) DEVICE — ADHESIVE DERMABOND ADVANCED

## (undated) DEVICE — CATH EMPULSE ANGLED 5FR PIGTAI

## (undated) DEVICE — CANNULA NASAL ADULT

## (undated) DEVICE — CATH PIG145 LANGSTON 6FR 110CM

## (undated) DEVICE — CATH IMPULSE FL4 5FR 100CM

## (undated) DEVICE — GUIDEWIRE AMPLATZ STIFF 260X7

## (undated) DEVICE — KIT MANIFOLD LOW PRESS TUBING

## (undated) DEVICE — CATH IMPULSE IM CRV 100CM 5FR